# Patient Record
Sex: MALE | URBAN - METROPOLITAN AREA
[De-identification: names, ages, dates, MRNs, and addresses within clinical notes are randomized per-mention and may not be internally consistent; named-entity substitution may affect disease eponyms.]

---

## 2024-04-08 ENCOUNTER — APPOINTMENT (RX ONLY)
Dept: URBAN - METROPOLITAN AREA CLINIC 41 | Facility: CLINIC | Age: 89
Setting detail: DERMATOLOGY
End: 2024-04-08

## 2024-04-08 DIAGNOSIS — L82.0 INFLAMED SEBORRHEIC KERATOSIS: ICD-10-CM | Status: INADEQUATELY CONTROLLED

## 2024-04-08 DIAGNOSIS — D16 BENIGN NEOPLASM OF BONE AND ARTICULAR CARTILAGE: ICD-10-CM

## 2024-04-08 PROBLEM — D16.9 BENIGN NEOPLASM OF BONE AND ARTICULAR CARTILAGE, UNSPECIFIED: Status: ACTIVE | Noted: 2024-04-08

## 2024-04-08 PROCEDURE — ? LIQUID NITROGEN

## 2024-04-08 PROCEDURE — 99202 OFFICE O/P NEW SF 15 MIN: CPT | Mod: 25

## 2024-04-08 PROCEDURE — 17110 DESTRUCTION B9 LES UP TO 14: CPT | Mod: 52

## 2024-04-08 PROCEDURE — ? COUNSELING

## 2024-04-08 ASSESSMENT — LOCATION DETAILED DESCRIPTION DERM
LOCATION DETAILED: LEFT FOREHEAD
LOCATION DETAILED: RIGHT SUPERIOR POSTERIOR NECK

## 2024-04-08 ASSESSMENT — LOCATION ZONE DERM
LOCATION ZONE: FACE
LOCATION ZONE: NECK

## 2024-04-08 ASSESSMENT — LOCATION SIMPLE DESCRIPTION DERM
LOCATION SIMPLE: POSTERIOR NECK
LOCATION SIMPLE: LEFT FOREHEAD

## 2024-04-08 NOTE — PROCEDURE: LIQUID NITROGEN
Show Aperture Variable?: Yes
Detail Level: Detailed
Number Of Freeze-Thaw Cycles: 2 freeze-thaw cycles
Duration Of Freeze Thaw-Cycle (Seconds): 5-10
Medical Necessity Clause: The following procedure was performed due to the following:  inflamed
Render Post-Care Instructions In Note?: no
Consent: Verbal consent was obtained and risks were reviewed including, but not limited to, scarring, infection, bleeding, scabbing, and incomplete removal
Medical Necessity Information: It is in your best interest to select a reason for this procedure from the list below. All of these items fulfill various CMS LCD requirements except the new and changing color options.
Spray Paint Text: The liquid nitrogen was applied to the skin utilizing a spray paint frosting technique.